# Patient Record
Sex: MALE | Race: WHITE | Employment: FULL TIME | URBAN - METROPOLITAN AREA
[De-identification: names, ages, dates, MRNs, and addresses within clinical notes are randomized per-mention and may not be internally consistent; named-entity substitution may affect disease eponyms.]

---

## 2021-01-04 ENCOUNTER — HOSPITAL ENCOUNTER (OUTPATIENT)
Dept: LAB | Age: 61
Discharge: HOME | End: 2021-01-04

## 2021-01-04 PROCEDURE — 88305 TISSUE EXAM BY PATHOLOGIST: CPT

## 2022-08-26 ENCOUNTER — TELEPHONE (OUTPATIENT)
Dept: CARDIOLOGY CLINIC | Age: 62
End: 2022-08-26

## 2022-08-26 NOTE — TELEPHONE ENCOUNTER
Dr Uriel Arndt called and said if you need to talk to him please feel free to call [phone]  Patient coming in on Thursday.    Thanks

## 2022-09-01 ENCOUNTER — INITIAL CONSULT (OUTPATIENT)
Dept: CARDIOLOGY CLINIC | Age: 62
End: 2022-09-01
Payer: COMMERCIAL

## 2022-09-01 VITALS
BODY MASS INDEX: 27.91 KG/M2 | DIASTOLIC BLOOD PRESSURE: 72 MMHG | SYSTOLIC BLOOD PRESSURE: 122 MMHG | WEIGHT: 188.4 LBS | HEIGHT: 69 IN

## 2022-09-01 DIAGNOSIS — I10 HTN (HYPERTENSION), BENIGN: Primary | ICD-10-CM

## 2022-09-01 DIAGNOSIS — R07.89 OTHER CHEST PAIN: ICD-10-CM

## 2022-09-01 DIAGNOSIS — E78.2 MIXED HYPERLIPIDEMIA: ICD-10-CM

## 2022-09-01 PROCEDURE — 99203 OFFICE O/P NEW LOW 30 MIN: CPT | Performed by: INTERNAL MEDICINE

## 2022-09-01 PROCEDURE — 93000 ELECTROCARDIOGRAM COMPLETE: CPT | Performed by: INTERNAL MEDICINE

## 2022-09-01 RX ORDER — FAMOTIDINE 20 MG/1
20 TABLET, FILM COATED ORAL DAILY
COMMUNITY

## 2022-09-01 ASSESSMENT — PATIENT HEALTH QUESTIONNAIRE - PHQ9
2. FEELING DOWN, DEPRESSED OR HOPELESS: 0
SUM OF ALL RESPONSES TO PHQ QUESTIONS 1-9: 0
SUM OF ALL RESPONSES TO PHQ9 QUESTIONS 1 & 2: 0
SUM OF ALL RESPONSES TO PHQ QUESTIONS 1-9: 0
1. LITTLE INTEREST OR PLEASURE IN DOING THINGS: 0

## 2022-09-01 ASSESSMENT — ENCOUNTER SYMPTOMS: SHORTNESS OF BREATH: 0

## 2022-09-01 NOTE — PROGRESS NOTES
[address] [place], [place]  [address], [facility] [address]  PHONE: [phone]      Tiffany Chua  1960    SUBJECTIVE:   Tiffany Chua is a 57 y.o. male seen for a consultation visit regarding the following:     Chief Complaint   Patient presents with    Consultation     Chest pain            HPI:  Consultation is requested by [unfilled] for evaluation of Consultation (Chest pain)   . 49-year-old male referred to me for evaluation of some left-sided type chest discomfort. Never had a history of heart disease but he had had a heart attack in his 45s he thinks. He does have a high blood pressure high cholesterol been treated for. Has had this for constant sore pain left lateral chest sometimes hurts more to breathe deeper cough is always had this chronic pain on the left outer shoulder. He works as a  and he climbs under houses and that does not document all he has no exacerbation doing it. Plays golf he walks did not seem to make it worse as well. no shortness of breath or other symptoms. He went to see Dr. Camille Chung and his EKG was normal.          Past Medical History, Past Surgical History, Family history, Social History, and Medications were all reviewed with the patient today and updated as necessary.        Allergies   Allergen Reactions    Codeine Nausea Only     Past Medical History:   Diagnosis Date    Allergic rhinitis, cause unspecified     Asthma     Back pain, chronic     Calculus of kidney 2006    Chronic pain of both knees     Hallux rigidus     right great toe    Hypertension     Mixed hyperlipidemia     Unspecified intestinal obstruction 1/11    partial SBO     Past Surgical History:   Procedure Laterality Date    COLONOSCOPY  8/27/07    normal    ORTHOPEDIC SURGERY      left knee surgery     Family History   Problem Relation Age of Onset    Asthma Maternal Grandmother     Hypertension Mother     Heart Disease Father     Cancer Paternal Grandfather lung    Hypertension Father      Social History     Tobacco Use    Smoking status: Never    Smokeless tobacco: Never   Substance Use Topics    Alcohol use: Yes       ROS:    Review of Systems   Constitutional: Negative for decreased appetite and weight loss. Cardiovascular:  Positive for chest pain. Negative for dyspnea on exertion, irregular heartbeat, leg swelling and palpitations. Respiratory:  Negative for shortness of breath. Hematologic/Lymphatic: Negative for bleeding problem. Neurological:  Negative for focal weakness and weakness. PHYSICAL EXAM:   /72   Ht 5' 9\" (1.753 m)   Wt 188 lb 6.4 oz (85.5 kg)   BMI 27.82 kg/m²      Physical Exam  Constitutional:       General: He is not in acute distress. Cardiovascular:      Rate and Rhythm: Normal rate and regular rhythm. Heart sounds: No murmur heard. No gallop. Comments: Mild tenderness to palpation left chest wall  Abdominal:      General: Abdomen is flat. Tenderness: There is no abdominal tenderness. Musculoskeletal:         General: No swelling. Cervical back: Neck supple. Neurological:      Mental Status: He is alert. Medical problems and test results were reviewed with the patient today. Results for orders placed or performed in visit on 09/01/22   EKG 12 Lead - Clinic Performed    Impression    Normal sinus rhythm rate of 79. Normal intervals. No ST changes. Outside EKG normal sinus rhythm no ST changes    ASSESSMENT and PLAN    Damon Wahl was seen today for consultation. Diagnoses and all orders for this visit:    HTN (hypertension), benign he says has been stable on lisinopril.  -     EKG 12 Lead - Clinic Performed  -     Exercise stress test; Future  -     CT CARDIAC CALCIUM SCORING; Future    Mixed hyperlipidemia he has been treated by his family doctor on pravastatin. -     Exercise stress test; Future  -     CT CARDIAC CALCIUM SCORING;  Future    Other chest pain patient's history of a constant chest pain normal EKGs x2 he does have some tenderness to palpation somewhat hurts to take a deep breath or cough. He apparently had a chest x-ray I do not have the results. He is able to do all of his activities which is pretty physical without any symptoms. He does have a family history and some risk factors. His EKG is normal so I can bring him back in this to a stress test here in the office and we will get him to a calcium score. -     Exercise stress test; Future  -     CT CARDIAC CALCIUM SCORING; Future      [unfilled]      No follow-up provider specified. Thank you for allowing me to participate in this patient's care. Please call or contact me if there are any questions or concerns regarding the above.       Ruthann Esqueda MD  09/01/22  9:09 AM        Consult note

## 2022-09-09 ENCOUNTER — OFFICE VISIT (OUTPATIENT)
Dept: CARDIOLOGY CLINIC | Age: 62
End: 2022-09-09
Payer: COMMERCIAL

## 2022-09-09 ENCOUNTER — HOSPITAL ENCOUNTER (OUTPATIENT)
Dept: CT IMAGING | Age: 62
Discharge: HOME | End: 2022-09-12

## 2022-09-09 DIAGNOSIS — I10 HTN (HYPERTENSION), BENIGN: ICD-10-CM

## 2022-09-09 DIAGNOSIS — R07.89 OTHER CHEST PAIN: ICD-10-CM

## 2022-09-09 DIAGNOSIS — E78.2 MIXED HYPERLIPIDEMIA: ICD-10-CM

## 2022-09-09 LAB
STRESS ANGINA INDEX: 0
STRESS BASELINE DIAS BP: 80 MMHG
STRESS BASELINE HR: 74 BPM
STRESS BASELINE ST DEPRESSION: 0 MM
STRESS BASELINE SYS BP: 130 MMHG
STRESS ESTIMATED WORKLOAD: 11 METS
STRESS EXERCISE DUR MIN: 9 MIN
STRESS EXERCISE DUR SEC: 30 SEC
STRESS PEAK DIAS BP: 80 MMHG
STRESS PEAK SYS BP: 184 MMHG
STRESS PERCENT HR ACHIEVED: 101 %
STRESS POST PEAK HR: 157 BPM
STRESS RATE PRESSURE PRODUCT: NORMAL BPM*MMHG
STRESS SR DUKE TREADMILL SCORE: 10
STRESS ST DEPRESSION: 0 MM
STRESS STAGE 1 BP: NORMAL MMHG
STRESS STAGE 1 DURATION: 3 MIN:SEC
STRESS STAGE 1 HR: 112 BPM
STRESS STAGE 2 BP: NORMAL MMHG
STRESS STAGE 2 DURATION: 3 MIN:SEC
STRESS STAGE 2 HR: 133 BPM
STRESS STAGE 3 BP: NORMAL MMHG
STRESS STAGE 3 DURATION: 3 MIN:SEC
STRESS STAGE 3 HR: 146 BPM
STRESS STAGE 4 DURATION: 0.5 MIN:SEC
STRESS STAGE 4 HR: 157 BPM
STRESS STAGE RECOVERY 1 BP: NORMAL MMHG
STRESS STAGE RECOVERY 1 DURATION: 1 MIN:SEC
STRESS STAGE RECOVERY 1 HR: 137 BPM
STRESS STAGE RECOVERY 2 BP: NORMAL MMHG
STRESS STAGE RECOVERY 2 DURATION: 2 MIN:SEC
STRESS STAGE RECOVERY 2 HR: 108 BPM
STRESS TARGET HR: 156 BPM

## 2022-09-09 PROCEDURE — 93015 CV STRESS TEST SUPVJ I&R: CPT | Performed by: INTERNAL MEDICINE

## 2022-09-09 NOTE — PROGRESS NOTES
75-year-old gentleman comes back for stress testing for atypical constant type left chest pain. Had mixed hyperlipidemia in the past only on pravastatin. Came back and did a stress test today going 30 seconds stage IV Vidal protocol max heart rate 157 which is 100%. He had no chest pain throughout the procedure no ST segment changes.   Recent calcium score today came back at a very low level 8 lungs and other structures are grossly normal.  Just getting me the recent lipid profile made to 1 switch the pravastatin to more potent statin